# Patient Record
(demographics unavailable — no encounter records)

---

## 2025-03-25 NOTE — ASSESSMENT
[FreeTextEntry1] : 1. Osteoporosis  Plan: Given h/o GERD and peg tube placement, would change Alendronate to Prolia. Before medication change, please check if pt need to have any invasive dental procedures - will check BMP and Vit D level to assure he has Vit D deficiency.  - Due for Bone scan June 2025.   Follow up in 3 month. Will schedule future office visit appointment every 6 month, when he is due for Prolia injection.

## 2025-03-25 NOTE — PHYSICAL EXAM
[No Respiratory Distress] : no respiratory distress [Normal Insight/Judgement] : insight and judgment were intact [de-identified] : Wheelchairbound

## 2025-03-25 NOTE — HISTORY OF PRESENT ILLNESS
[FreeTextEntry1] : 72 year old, from [facility], with medical h/o OP, GERD, dysphagia s/p peg tube, neurogenic bladder, chronic suprapubic catheter presented to establish care.  Pt is accompanied by aid, she is not aware of pt's medical conditions.   Contacted the nurse, Elda ([phone]) at [facility] facility for more information.   Labs-  March 2024- eGFR 94 with calcium of 9.8  Imaging-  Bone scan June 2023- (comparison with Nov 2020) L1-L4: BMD: - T score: 1.7 (increased 3.5% as compared to prior study  Total Hip: Non evaluable secondary to severe degenerative changes   It's unclear when pt was initially started on Alendronate. Per Elda he came to current facility about 2 years ago on Alendronate.  Currently on Alendronate 70 mg daily, Ca-Vit D 600mg/ 400 IU BID all via G tube.   He is wheelchair bound.    For hyperlipidemia he takes Atorvastatin 10 mg daily and omega3 1000 mg BID.

## 2025-04-01 NOTE — ASSESSMENT
[FreeTextEntry1] : Likely bladder spasm causing spt leakage: increase oxybutynin to 10mg bid c/w gemtesa 75mg daily myrbetriq couldn't be crushed - not taking advised to start irrigating SPT once a week currently has 18 Fr / 10ml balloon cath. If leakage worsens in future can consider upsizing size

## 2025-04-01 NOTE — PHYSICAL EXAM
[Edema] : no peripheral edema [] : no respiratory distress [Exaggerated Use Of Accessory Muscles For Inspiration] : no accessory muscle use [Abdomen Soft] : soft [FreeTextEntry1] : in wheelchair [de-identified] : SPT in place [de-identified] : in wheelchair

## 2025-04-01 NOTE — REVIEW OF SYSTEMS
[see HPI] : see HPI [Limb Weakness] : limb weakness [Difficulty Walking] : difficulty walking [Negative] : Gastrointestinal

## 2025-04-01 NOTE — HISTORY OF PRESENT ILLNESS
[FreeTextEntry1] :  72-year-old man with PMHx of ID, dysphagia status post peg tube, chronic SPT, hx of VRE and ESBL UTI presents with Aide for SPT leakage he is from [facility] home   He had a Ureteroscopy and removal of stone june 5 2024